# Patient Record
Sex: MALE | Race: WHITE | NOT HISPANIC OR LATINO | Employment: FULL TIME | URBAN - NONMETROPOLITAN AREA
[De-identification: names, ages, dates, MRNs, and addresses within clinical notes are randomized per-mention and may not be internally consistent; named-entity substitution may affect disease eponyms.]

---

## 2024-04-18 DIAGNOSIS — M25.551 PAIN OF RIGHT HIP: Primary | ICD-10-CM

## 2024-04-19 ENCOUNTER — HOSPITAL ENCOUNTER (OUTPATIENT)
Dept: GENERAL RADIOLOGY | Facility: OTHER | Age: 49
Discharge: HOME | End: 2024-04-19
Attending: SPECIALIST
Payer: COMMERCIAL

## 2024-04-19 ENCOUNTER — OFFICE VISIT (OUTPATIENT)
Dept: ORTHOPEDICS | Facility: OTHER | Age: 49
End: 2024-04-19
Attending: SPECIALIST
Payer: COMMERCIAL

## 2024-04-19 DIAGNOSIS — M47.816 OSTEOARTHRITIS OF LUMBAR SPINE, UNSPECIFIED SPINAL OSTEOARTHRITIS COMPLICATION STATUS: Primary | ICD-10-CM

## 2024-04-19 DIAGNOSIS — M25.551 PAIN OF RIGHT HIP: ICD-10-CM

## 2024-04-19 PROCEDURE — 99203 OFFICE O/P NEW LOW 30 MIN: CPT | Performed by: SPECIALIST

## 2024-04-19 PROCEDURE — 73502 X-RAY EXAM HIP UNI 2-3 VIEWS: CPT

## 2024-04-19 NOTE — PROGRESS NOTES
Surgical Clinic Consult  Primary physician:     Risa Dela Cruz      History of present illness:  This is a 48 year old male I am seeing in consultation for right hip pain.  He has a history of [place] these disease subsequently had ongoing back pain he is real [place] and had x-rays which showed degenerative changes of the lumbar spine.  A CT scan was obtained and he was told that he had facet arthrosis.  He had facet injection which improved his pain substantially.  He is seeing me for a second opinion.    Past medical history:   No past medical history on file.    Pastsurgical history:  Past Surgical History:   Procedure Laterality Date    ARTHROSCOPY SHOULDER RT/LT  08/11/09    left shoulder arthroscopic posterior labral repair    ZZHC EXC BENIGN SKIN LESION TRUNK/ARM/LEG <=0.5 CM      had a neck cyst removed from right side       Current medications:  Current Outpatient Medications   Medication Sig Dispense Refill    acetaminophen (TYLENOL) 500 MG tablet Take 2 tablets (1,000 mg) by mouth every 6 hours as needed for pain or fever 100 tablet 11    ALBUTEROL 90 MCG/ACT IN AERS INHALE 2 PUFFS BY MOUTH FOUR TIMES DAILY AS NEEDED FOR SHORTNESS OF BREATH/WHEEZE      EPIPEN 2-KIM Use as Directed PRN 2 0    ibuprofen (ADVIL,MOTRIN) 200 MG tablet Take 3 tablets (600 mg) by mouth every 6 hours as needed for pain or fever 120 tablet     montelukast (SINGULAIR) 10 MG tablet Take 1 tablet (10 mg) by mouth At Bedtime 30 tablet 5       Allergies:  Allergies   Allergen Reactions    Bees      Can feel it in his throat-starts to swell/ wheezing    Eggs      Asthma attack when in room where they are being cooked.  Doesn't eat anything with eggs    Pcn [Penicillins]      He states at age 2, had severe reaction. His Mother informed him.       Family history:  Family History   Problem Relation Age of Onset    Heart Disease Mother         MI x 3    Hypertension Mother     Heart Disease Father         MI x 4-5    Lipids  Father     Cancer Father         lung    Heart Disease Paternal Grandfather          of MI    Heart Disease Paternal Grandmother          of mi    Diabetes Paternal Grandmother     Heart Disease Maternal Grandfather          of mi    Prostate Cancer Maternal Grandfather        Social history:  Social History     Socioeconomic History    Marital status:      Spouse name: Cristina    Number of children: 3    Years of education: Not on file    Highest education level: Not on file   Occupational History     Employer: [employer]     Comment: currently unemployed, previously    Tobacco Use    Smoking status: Some Days     Current packs/day: 0.50     Average packs/day: 0.5 packs/day for 20.0 years (10.0 ttl pk-yrs)     Types: Cigarettes    Smokeless tobacco: Never    Tobacco comments:     6-8 a day    Substance and Sexual Activity    Alcohol use: Yes     Comment: rarely    Drug use: No    Sexual activity: Yes     Partners: Female   Other Topics Concern     Service No    Blood Transfusions No    Caffeine Concern No    Occupational Exposure No    Hobby Hazards No    Sleep Concern No    Stress Concern No    Weight Concern No    Special Diet No    Back Care No    Exercise Yes    Bike Helmet No    Seat Belt Yes    Self-Exams Yes    Parent/sibling w/ CABG, MI or angioplasty before 65F 55M? Not Asked   Social History Narrative    Not on file     Social Determinants of Health     Financial Resource Strain: Not on file   Food Insecurity: Not on file   Transportation Needs: Not on file   Physical Activity: Insufficiently Active (2023)    Received from [facility] and [facility]    Exercise Vital Sign     Days of Exercise per Week: 7 days     Minutes of Exercise per Session: 20 min   Stress: Not on file   Social Connections: Not on file   Interpersonal Safety: Not on file   Housing Stability: Not on file       PROBLEM LIST:  Patient Active Problem List    Diagnosis    Tobacco use disorder    GERD (gastroesophageal reflux disease)    Pain in shoulder    Bicipital tendinitis    Rotator cuff injury    BPH (benign prostatic hypertrophy)    CARDIOVASCULAR SCREENING; LDL GOAL LESS THAN 160    Anxiety    Recurrent major depression in complete remission (H24)       Review of Systems:  COMPLETE 12 point REVIEW OF SYSTEMS is otherwise negative with the exception of which is stated above.    Physical exam: There were no vitals taken for this visit.    General: this is a pleasant male patient in no acute distress.  Patient is awake alert and oriented x3 .  Well-groomed, well kempt.  EXAM:  Musculoskeletal: Examination shows he ambulates without an antalgic gait hip range of motion is nontender.    Imaging: X-rays reviewed AP pelvis and lateral view of the right hip show no evidence of significant hip arthrosis.  He does show significant degenerative changes of the lumbar sacral spine.    Assessment:   Pinning related to lumbosacral facet arthrosis.  The patient has had an MRI last September this is not available.  We discussed further evaluation I think that we should have him see Dr. Ray Philippe for evaluation.  He will call the office to see if the new MRI will be necessary.    Plan:    As above      Lopez Bobby MD

## 2024-06-16 ENCOUNTER — HEALTH MAINTENANCE LETTER (OUTPATIENT)
Age: 49
End: 2024-06-16

## 2025-02-17 ENCOUNTER — OFFICE VISIT (OUTPATIENT)
Dept: ORTHOPEDICS | Facility: OTHER | Age: 50
End: 2025-02-17
Attending: ORTHOPAEDIC SURGERY
Payer: COMMERCIAL

## 2025-02-17 ENCOUNTER — HOSPITAL ENCOUNTER (OUTPATIENT)
Dept: GENERAL RADIOLOGY | Facility: OTHER | Age: 50
Discharge: HOME | End: 2025-02-17
Attending: ORTHOPAEDIC SURGERY
Payer: COMMERCIAL

## 2025-02-17 VITALS — OXYGEN SATURATION: 96 % | HEART RATE: 69 BPM

## 2025-02-17 DIAGNOSIS — G89.29 CHRONIC LEFT SHOULDER PAIN: Primary | ICD-10-CM

## 2025-02-17 DIAGNOSIS — M25.512 CHRONIC LEFT SHOULDER PAIN: Primary | ICD-10-CM

## 2025-02-17 DIAGNOSIS — M25.512 LEFT SHOULDER PAIN, UNSPECIFIED CHRONICITY: ICD-10-CM

## 2025-02-17 PROCEDURE — 73030 X-RAY EXAM OF SHOULDER: CPT | Mod: LT

## 2025-02-17 PROCEDURE — 99213 OFFICE O/P EST LOW 20 MIN: CPT | Performed by: ORTHOPAEDIC SURGERY

## 2025-02-17 ASSESSMENT — PAIN SCALES - GENERAL: PAINLEVEL_OUTOF10: MODERATE PAIN (6)

## 2025-02-17 NOTE — PROGRESS NOTES
Subjective:    49-year-old gentleman with left shoulder pain.  He has a history of rotator cuff repair and labral repair done in 2009 by a physician in [place].  He states his left shoulder clicks and he has significant amount of anterior pain.  No injury that he can remember since the time of the surgery.  This been going on for several years at this point.    Objective:    On examination today is a 49-year-old gentleman in no acute stress.  Very pleasant on examination.  He is full range of motion of bilateral shoulders all the way up to 170 degrees.  He is a positive Neer impingement sign on the left.  He is tender to palpation at the AC joint, tender to palpation at Codman's point and mildly tender to palpation of the bicipital groove.  He is otherwise neuro and vascularly intact.  Testing of the rotator cuff reveals mild weakness with supraspinatus testing full strength otherwise    Assessment:    49-year-old gentleman with possible recurrent rotator cuff tear of his left shoulder.  He has ongoing AC joint arthritis as well.    Plan:    We are to go ahead and obtain an MRI of his left shoulder and I will call him with results.

## 2025-06-21 ENCOUNTER — HEALTH MAINTENANCE LETTER (OUTPATIENT)
Age: 50
End: 2025-06-21